# Patient Record
Sex: FEMALE | Race: WHITE | Employment: UNEMPLOYED | URBAN - METROPOLITAN AREA
[De-identification: names, ages, dates, MRNs, and addresses within clinical notes are randomized per-mention and may not be internally consistent; named-entity substitution may affect disease eponyms.]

---

## 2017-01-04 DIAGNOSIS — F41.8 DEPRESSION WITH ANXIETY: ICD-10-CM

## 2017-01-04 RX ORDER — VENLAFAXINE HYDROCHLORIDE 75 MG/1
75 CAPSULE, EXTENDED RELEASE ORAL 2 TIMES DAILY
Qty: 60 CAPSULE | Refills: 3 | Status: SHIPPED | OUTPATIENT
Start: 2017-01-04 | End: 2017-02-03

## 2017-01-04 NOTE — TELEPHONE ENCOUNTER
Syeda Quinn MA        Caller: Unspecified (Today, 11:00 AM)                     Pt [phone]   PT GOT REFILL ON VENLAFAXINE AND HAS ALWAYS BEEN ON 2 A DAY BUT ONLY GOT 1 A DAY AND NOW  2 WEEKS IN TO SCRIPT ,  SHE IS OUT WE NEED TO CALL IN CORRECT SCRIPT TO [facility]         Spoke with Pt and informed her that correct qty has been sent to pharmacy. Pt verbalized much appr'c.

## 2017-01-28 DIAGNOSIS — F41.9 ANXIETY: ICD-10-CM

## 2017-01-31 RX ORDER — ALPRAZOLAM 1 MG/1
TABLET ORAL
Qty: 90 TABLET | Refills: 2 | Status: SHIPPED | OUTPATIENT
Start: 2017-01-31 | End: 2017-05-01 | Stop reason: SDUPTHER

## 2017-05-01 DIAGNOSIS — F41.9 ANXIETY: ICD-10-CM

## 2017-05-02 ENCOUNTER — TELEPHONE (OUTPATIENT)
Dept: INTERNAL MEDICINE | Facility: CLINIC | Age: 42
End: 2017-05-02

## 2017-05-02 RX ORDER — ALPRAZOLAM 1 MG/1
TABLET ORAL
Qty: 90 TABLET | Refills: 2 | Status: SHIPPED | OUTPATIENT
Start: 2017-05-02 | End: 2017-08-29 | Stop reason: SDUPTHER

## 2017-05-03 RX ORDER — VENLAFAXINE HYDROCHLORIDE 75 MG/1
75 CAPSULE, EXTENDED RELEASE ORAL 2 TIMES DAILY
Qty: 60 CAPSULE | Refills: 3 | Status: SHIPPED | OUTPATIENT
Start: 2017-05-03 | End: 2017-08-29 | Stop reason: SDUPTHER

## 2017-08-22 DIAGNOSIS — F41.9 ANXIETY: ICD-10-CM

## 2017-08-22 RX ORDER — ALPRAZOLAM 1 MG/1
TABLET ORAL
Qty: 90 TABLET | Refills: 3 | OUTPATIENT
Start: 2017-08-22

## 2017-08-29 ENCOUNTER — TELEPHONE (OUTPATIENT)
Dept: INTERNAL MEDICINE | Facility: CLINIC | Age: 42
End: 2017-08-29

## 2017-08-29 DIAGNOSIS — F41.9 ANXIETY: ICD-10-CM

## 2017-08-29 RX ORDER — ALPRAZOLAM 1 MG/1
1 TABLET ORAL 3 TIMES DAILY PRN
Qty: 90 TABLET | Refills: 1 | OUTPATIENT
Start: 2017-08-29

## 2017-08-29 RX ORDER — VENLAFAXINE HYDROCHLORIDE 75 MG/1
75 CAPSULE, EXTENDED RELEASE ORAL 2 TIMES DAILY
Qty: 60 CAPSULE | Refills: 1 | Status: SHIPPED | OUTPATIENT
Start: 2017-08-29 | End: 2017-09-28

## 2017-08-29 NOTE — TELEPHONE ENCOUNTER
----- Message from [place] sent at 8/29/2017  9:35 AM EDT -----  PT NEEDS RX FOR VENLAFAXINE SENT TO [facility]. ALSO, WOULD LIKE ALPRAZOLAM SENT OVER SO THE PHARMACY HAS IT ON FILE FOR THE 10TH.     PATIENT: [phone]

## 2017-08-29 NOTE — TELEPHONE ENCOUNTER
RX's called into [facility]. Pt needs to schedule a fu.    LVM for Pt to return call. Office number given.

## 2017-08-29 NOTE — TELEPHONE ENCOUNTER
Patient returned call and informed both rx's called to [facility] but no further refills until follow up appt made.  Patient verbalized understanding and will call back when she has her schedule and understands he is booked about 3 weeks out.

## 2017-08-29 NOTE — TELEPHONE ENCOUNTER
Give a 1 month supply on each     With 1 refills    Please have patient make a follow up appointment. No further refills until then.